# Patient Record
(demographics unavailable — no encounter records)

---

## 2025-06-26 NOTE — ASSESSMENT
[FreeTextEntry1] : 23-year-old male for evaluation as mostly of left wrist injury.  Patient reports 2 months ago he had an injury while benching.  Twisted his left wrist.  Reports pain and weakness that which has overall improved over the ulnar aspect of the wrist and TFCC area.  He has tried conservative management including anti-inflammatories and at home therapeutic programs with no relief.  Went for an MRI which revealed a partial TFCC tear.  He reports he is much improved overall and has no pain at this time.  L wrist: Swelling Tender TFCC Pain with pronation/supination Decreased wrist ROM +TFCC grind  X-rays of left wrist taken in the office today:  No acute fractures, subluxations, or dislocations.  MRI of the wrist revealed a partial tear of the TFCC.  we reviewed the anatomy, pathology, and treatment options for TFCC tears. We discussed that most of the TFCC is avascular and tears are slow to heal if at all but that surgical results are not guaranteed due to the poor healing capacity of the structure.  Even at surgery, most tears cannot be repaired, but are merely debrided.  We discussed the use of nsaids, bracing, rest, local modalities, injection and surgery in the treatment of TFCC tears. At this point, the patient will proceed with non-operative treatment.  Patient is much improved overall.  I offered him therapy but he kindly declined.  He would like to return to activity within his limitations of pain.  He has been improving overall. All questions and concerns addressed to patient's satisfaction. Patient expresses full understanding of treatment plan.